# Patient Record
Sex: FEMALE | Race: WHITE | NOT HISPANIC OR LATINO | URBAN - METROPOLITAN AREA
[De-identification: names, ages, dates, MRNs, and addresses within clinical notes are randomized per-mention and may not be internally consistent; named-entity substitution may affect disease eponyms.]

---

## 2022-12-29 ENCOUNTER — EMERGENCY (EMERGENCY)
Facility: HOSPITAL | Age: 42
LOS: 1 days | Discharge: HOME | End: 2022-12-29
Admitting: EMERGENCY MEDICINE
Payer: COMMERCIAL

## 2022-12-29 VITALS
RESPIRATION RATE: 166 BRPM | WEIGHT: 169.98 LBS | HEIGHT: 67 IN | TEMPERATURE: 98 F | OXYGEN SATURATION: 99 % | DIASTOLIC BLOOD PRESSURE: 56 MMHG | SYSTOLIC BLOOD PRESSURE: 113 MMHG | HEART RATE: 97 BPM

## 2022-12-29 DIAGNOSIS — S01.01XA LACERATION WITHOUT FOREIGN BODY OF SCALP, INITIAL ENCOUNTER: ICD-10-CM
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
## 2022-12-29 PROCEDURE — 12001 RPR S/N/AX/GEN/TRNK 2.5CM/<: CPT

## 2022-12-29 PROCEDURE — 99283 EMERGENCY DEPT VISIT LOW MDM: CPT | Mod: 25

## 2022-12-29 RX ORDER — BACITRACIN ZINC 500 UNIT/G
1 OINTMENT IN PACKET (EA) TOPICAL ONCE
Refills: 0 | Status: DISCONTINUED | OUTPATIENT
Start: 2022-12-29 | End: 2023-01-01

## 2022-12-29 NOTE — ED PROVIDER NOTE - CLINICAL SUMMARY MEDICAL DECISION MAKING FREE TEXT BOX
.Patient presents with scalp laceration after hitting her head against an open kitchen cabinet door.  Denies LOC, dizziness, paresthesias, focal weakness, on exam with 1.5 cm laceration over the frontal scalp.  Neurovascularly intact.  Laceration repaired without complication.  Advised bacitracin twice daily.  All precautions reviewed.  Advised return in 10 days for staple removal.

## 2022-12-29 NOTE — ED PROVIDER NOTE - PROVIDER TOKENS
FREE:[LAST:[ED or [facility]gent care],PHONE:[(   )    -],FAX:[(   )    -],ADDRESS:[10 days for staple removal]]

## 2022-12-29 NOTE — ED PROVIDER NOTE - PATIENT PORTAL LINK FT
You can access the FollowMyHealth Patient Portal offered by [facility] by registering at the following website: [email or web address]. By joining [employer]’s FollowMyHealth portal, you will also be able to view your health information using other applications (apps) compatible with our system.

## 2022-12-29 NOTE — ED PROVIDER NOTE - OBJECTIVE STATEMENT
No past medical history no allergies presents with laceration to the scalp.  States that she was at home and hit her head against open kitchen covered.  Denies fall, denies LOC. denies any neck pain, denies any focal weakness, denies any paresthesias. denies nausea, vomiting.  Last tetanus was 2 years ago.

## 2022-12-29 NOTE — ED PROVIDER NOTE - NSFOLLOWUPINSTRUCTIONS_ED_ALL_ED_FT
Apply bacitracin to wound twice daily.  Return in 10 days for staple removal.  Return immediately if fever, chills, increased pain, discharge from wound site.

## 2022-12-29 NOTE — ED PROVIDER NOTE - CARE PROVIDER_API CALL
ED or [facility]gent care,   10 days for staple removal  Phone: (   )    -  Fax: (   )    -  Follow Up Time:

## 2022-12-29 NOTE — ED ADULT NURSE NOTE - OBJECTIVE STATEMENT
PT aox3 with steady gait. Pt hit her head against door. Lac noted to scalp. No LOC, not bleeding on arrival.

## 2023-01-08 ENCOUNTER — EMERGENCY (EMERGENCY)
Facility: HOSPITAL | Age: 43
LOS: 1 days | Discharge: HOME | End: 2023-01-08
Admitting: EMERGENCY MEDICINE
Payer: COMMERCIAL

## 2023-01-08 VITALS
HEIGHT: 67 IN | TEMPERATURE: 98 F | HEART RATE: 79 BPM | DIASTOLIC BLOOD PRESSURE: 66 MMHG | OXYGEN SATURATION: 99 % | RESPIRATION RATE: 18 BRPM | SYSTOLIC BLOOD PRESSURE: 109 MMHG

## 2023-01-08 PROBLEM — Z78.9 OTHER SPECIFIED HEALTH STATUS: Chronic | Status: ACTIVE | Noted: 2022-12-29

## 2023-01-08 PROCEDURE — L9995: CPT

## 2023-01-08 NOTE — ED PROVIDER NOTE - PATIENT PORTAL LINK FT
You can access the FollowMyHealth Patient Portal offered by [facility] by registering at the following website: [email or web address]. By joining [employer]’s FollowMyHealth portal, you will also be able to view your health information using other applications (apps) compatible with our system.

## 2023-01-08 NOTE — ED PROVIDER NOTE - OBJECTIVE STATEMENT
41 y/o F presents for staple removal from scalp. Staples were placed in this ED 10 days ago. No fever, no chills, no other complaints.

## 2023-01-08 NOTE — ED PROVIDER NOTE - CLINICAL SUMMARY MEDICAL DECISION MAKING FREE TEXT BOX
Patient appears for staple removal from frontal scalp. 1 staple removed from scalp. Patient appears vibrant and well on discharge.

## 2023-01-10 DIAGNOSIS — S01.01XD LACERATION WITHOUT FOREIGN BODY OF SCALP, SUBSEQUENT ENCOUNTER: ICD-10-CM
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
